# Patient Record
Sex: MALE | Race: WHITE | URBAN - NONMETROPOLITAN AREA
[De-identification: names, ages, dates, MRNs, and addresses within clinical notes are randomized per-mention and may not be internally consistent; named-entity substitution may affect disease eponyms.]

---

## 2017-09-27 ENCOUNTER — HOSPITAL ENCOUNTER (OUTPATIENT)
Dept: PHYSICAL THERAPY | Age: 57
Discharge: HOME | End: 2017-09-30
Attending: ORTHOPAEDIC SURGERY | Admitting: ORTHOPAEDIC SURGERY

## 2017-09-27 ASSESSMENT — PAIN DESCRIPTION - DIRECTION: RADIATING_TOWARDS: PATELLA

## 2017-09-27 ASSESSMENT — PAIN DESCRIPTION - ORIENTATION: ORIENTATION: RIGHT

## 2017-09-27 ASSESSMENT — PAIN DESCRIPTION - DESCRIPTORS: DESCRIPTORS: DULL

## 2017-09-27 ASSESSMENT — PAIN SCALES - GENERAL: PAINLEVEL_OUTOF10: 0

## 2017-09-27 ASSESSMENT — PAIN DESCRIPTION - PAIN TYPE: TYPE: ACUTE PAIN

## 2017-09-27 NOTE — FLOWSHEET NOTE
,    Outpatient Physical Therapy           [place]           [] Phone: [phone]   Fax: [phone]  [place]           [] Phone: [phone]   Fax: [phone]    Physical Therapy Daily Treatment Note  Date:  2017    Patient Name:  Nora Martini    :  1960  MRN: [id number]  Restrictions/Precautions: deep knee bends  Diagnosis:   Diagnosis: Right knee pain OA and effusion  Date of Surgery: na  Treatment Diagnosis: Treatment Diagnosis: ROM and strength    Insurance/Certification information:  Self Pay  Referring Physician:  Referring Practitioner: Dr Kia Meek  Next Doctor Visit:  PRN  Plan of care signed (Y/N):  n  Visit# / total visits: 1 /2  Pain level: 0/10   Goals:       Short term goals  Time Frame for Short term goals: 4 weeks  Short term goal 1: Patient will be I with HEP  Short term goal 2: Louise Catherine will be able to report <2/10 pain with functional activities  Short term goal 3: Patient will be able to ascend and descend 12 stairs no isses  Short term goal 4: Patient will understand the progrerssion of exercises to avoid knee pain            Subjective:    See eval    Any changes in Ambulatory Summary Sheet?none      Objective:    See eval      Exercises:  Exercise/Equipment Date Date Date Date      17                 S/L Hip abduction x20 ea        S/L clamshells GTB X30 EA        Lateral walk BTB X50 feet        Hip 4 way GTB X10 EA        6\" Step down x30 ea                                                                                                                       Other Therapeutic Activities/Education:  Patient received education on their current pathology and how their condition effects them with their functional activities. Patient understood discussion and questions were answered. Patient understands their activity limitations and understands rational for treatment progression.       Home Exercise Program:  Pt completed 1 set of HEP in clinic with instruction per HEP sheet dated today. Appeared to understand program. Any questions clarified. Modality/intervention used:    [x] Therapeutic Exercise  [] Modalities:  [] Therapeutic Activity     [] Ultrasound  [] Elec  Stim  [] Gait Training      [] Cervical Traction [] Lumbar Traction  [] Neuromuscular Re-education    [] Cold/hotpack [] Iontophoresis   [] Instruction in HEP      [] Vasopneumatic     [] Manual Therapy               [] Aquatic Therapy     Manual Treatments:  NONE    Modalities:  NONE    Communication with other providers:  Faxed POC    Education provided to patient/caregiver:  Patient received education on their current pathology and how their condition effects them with their functional activities. Patient understood discussion and questions were answered. Patient understands their activity limitations and understands rational for treatment progression. Adverse reactions to treatment:  GOOD    Equipment provided:  GTB    Assessment:    Patient is a 55 y/o male with a right OA knee confirmed vai xrays demonstrating decreaes ROM decrease strneght genu valgus with weight bearing and overall genreral health is good. He workout out regular and understands progressions. Given HEP with instructions and will see if he has issues or additional questions. Will discharge in 4 weeks if  he has not retunrd a call. Patient agrees with established plan of care and assisted in the development of their short term and long term goals. Patient had no adverse reaction with initial treatment and there are no barriers to learning. Demonstrates no mental or cognitive disorder. Patient reports they learn best through demonstration.  Patient reports prior level of function has been modified when the pain is high but able to adjust.          Time In / Time Out:       8290-4210                Timed Code/Total Treatment Minutes:  1 Eval   2 TE    Patients Report of Tolerance:    [] Patient limited by fatigue        [] Patient limited by

## 2017-09-27 NOTE — PROGRESS NOTES
5/5  Strength LLE  L Hip Flexion: 5/5  L Hip Extension: 5/5  L Knee Flexion: 5/5  L Knee Extension: 5/5  L Ankle Dorsiflexion: 5/5  L Ankle Plantar Flexion: 5/5  Tone RLE  RLE Tone: Normotonic  Tone LLE  LLE Tone: Normotonic  Motor Control  Gross Motor?: WNL  Additional Measures  Flexibility: 52 on the left and 58 on the right SLR hamstring flexibility  Girth: equal  Special Tests: negaitve patellar grind and lateral pull test  Other: na  Sensation  Overall Sensation Status: WNL  Bed mobility  Bridging: Independent  Supine to Sit: Independent  Sit to Supine: Independent  Transfers  Sit to Stand: Independent  Stand to sit: Independent  Ambulation  WB Status: see above                            Assessment   Conditions Requiring Skilled Therapeutic Intervention  Body structures, Functions, Activity limitations: Decreased functional mobility ; Decreased ROM; Decreased strength  Assessment: Patient is a 57 y/o male with a right OA knee confirmed vai xrays demonstrating decreaes ROM decrease strneght genu valgus with weight bearing and overall genreral health is good. He workout out regular and unuderstands progressions. Given HEP with instructions and will see if he has issues or additional questions. Will discharge in 4 weeks if  he has not retunrd a call. Treatment Diagnosis: ROM and strength  Prognosis: Good  Decision Making: Low Complexity  Activity Tolerance  Activity Tolerance: Patient Tolerated treatment well         Plan   Plan  Times per week: 1  Plan weeks: 4  Current Treatment Recommendations: Strengthening, ROM    G-Code  PT G-Codes  Functional Assessment Tool Used: lefts  Score: 56  Functional Limitation: Mobility: Walking and moving around  Mobility: Walking and Moving Around Current Status ():  At least 20 percent but less than 40 percent impaired, limited or restricted  Mobility: Walking and Moving Around Goal Status (): 0 percent impaired, limited or restricted    OutComes Score Goals  Short term goals  Time Frame for Short term goals: 4 weeks  Short term goal 1: Patient will be I with HEP  Short term goal 2: QPatient will be able to report <2/10 pain with functional activities  Short term goal 3: Patient will be able to ascend and descend 12 stairs no isses  Short term goal 4: Patient will understand the progrerssion of exercises to avoid knee pain  Patient Goals   Patient goals : no pain get stronger       Therapy Time   Individual Concurrent Group Co-treatment   Time In           Time Out           Minutes                   Eli Romero PT DPT     #[id number]

## 2017-09-27 NOTE — PROGRESS NOTES
Cold/hotpack [] Iontophoresis   [x] Instruction in HEP      [] Vasopneumatic     [x] Manual Therapy               [] Aquatic Therapy       Other:    ? Frequency/Duration:  # Days per week: [x] 1 day # Weeks: [] 1 week [] 5 weeks     [] 2 days?    [] 2 weeks [] 6 weeks     [] 3 days   [] 3 weeks [] 7 weeks     [] 4 days   [x] 4 weeks [] 8 weeks         [] 9 weeks [] 10 weeks         [] 11 weeks [] 12 weeks    Rehab Potential/Progress: [] Excellent [x] Good [] Fair  [] Poor     Goals:      Short term goals  Time Frame for Short term goals: 4 weeks  Short term goal 1: Patient will be I with HEP  Short term goal 2: QPatient will be able to report <2/10 pain with functional activities  Short term goal 3: Patient will be able to ascend and descend 12 stairs no isses  Short term goal 4: Patient will understand the progrerssion of exercises to avoid knee pain       G-Code Selection: (On Eval and every 10th visit or Discharge)  MEASURE  [] Mobility: Walking and Moving Around     [x] Current ()   [x] Goal ()   [] DC ()    SEVERITY  CURRENT  GOAL  DISCHARGE   [] CH (0% Impaired, Indep.)  [] CI (1-19% Impaired, SBA-CGA)  [x] CJ (20-39% Impaired, MIN A)  [] CK  (40-59% Impairment, Mod A)  [] CL  (60-79% Impairment, Max A)  [] CM  (80-99% Impairment, Dep.)   [] CN  (100% Impairment, Tot Dep.) [] CH (0% Impaired, Indep.)  [x] CI (1-19% Impaired, SBA-CGA)  [] CJ (20-39% Impaired, MIN A)  [] CK  (40-59% Impairment, Mod A)  [] CL  (60-79% Impairment, Max A)  [] CM  (80-99% Impairment, Dep.)   [] CN  (100% Impairment, Tot Dep.)  [] CH (0% Impaired, Indep.)  [] CI (1-19% Impaired, SBA-CGA)  [] CJ (20-39% Impaired, MIN A)  [] CK  (40-59% Impairment, Mod A)  [] CL  (60-79% Impairment, Max A)  [] CM  (80-99% Impairment, Dep.)   [] CN  (100% Impairment, Tot Dep.)          Electronically signed by:  Jennifer Ordonez PT, 9/27/2017, 9:37 AM     #[id number]      If you have any questions or concerns, please don't hesitate to

## 2017-10-01 ENCOUNTER — HOSPITAL ENCOUNTER (OUTPATIENT)
Dept: PHYSICAL THERAPY | Age: 57
Discharge: HOME | End: 2017-10-31
Attending: ORTHOPAEDIC SURGERY | Admitting: ORTHOPAEDIC SURGERY